# Patient Record
Sex: MALE | Race: WHITE | NOT HISPANIC OR LATINO | Employment: FULL TIME | URBAN - METROPOLITAN AREA
[De-identification: names, ages, dates, MRNs, and addresses within clinical notes are randomized per-mention and may not be internally consistent; named-entity substitution may affect disease eponyms.]

---

## 2017-03-01 ENCOUNTER — OFFICE VISIT (OUTPATIENT)
Dept: FAMILY MEDICINE CLINIC | Facility: CLINIC | Age: 37
End: 2017-03-01

## 2017-03-01 VITALS
WEIGHT: 182 LBS | OXYGEN SATURATION: 99 % | DIASTOLIC BLOOD PRESSURE: 78 MMHG | BODY MASS INDEX: 23.36 KG/M2 | SYSTOLIC BLOOD PRESSURE: 112 MMHG | HEIGHT: 74 IN | HEART RATE: 72 BPM

## 2017-03-01 DIAGNOSIS — M62.838 TRAPEZIUS MUSCLE SPASM: Primary | ICD-10-CM

## 2017-03-01 PROCEDURE — 99203 OFFICE O/P NEW LOW 30 MIN: CPT | Performed by: NURSE PRACTITIONER

## 2017-03-01 RX ORDER — CYCLOBENZAPRINE HCL 10 MG
10 TABLET ORAL 3 TIMES DAILY PRN
Qty: 30 TABLET | Refills: 0 | Status: SHIPPED | OUTPATIENT
Start: 2017-03-01 | End: 2022-10-16

## 2017-03-01 NOTE — PROGRESS NOTES
Subjective   Garth Mckeon is a 36 y.o. male.     History of Present Illness NP presents with right shoulder and neck pain that has been present for about a week. Patient feels that he may have pulled something while in the gym over a year ago. Pain has been recurrent since then and usually lasts about one week. Pain is described as sharp and is present with movement and sitting. Limited ROM. He denies numbness, tingling, or weakness. Using Ibuprofen and rest.     The following portions of the patient's history were reviewed and updated as appropriate: allergies, current medications, past family history, past medical history, past social history, past surgical history and problem list.    Review of Systems   Musculoskeletal: Positive for neck pain.       Objective   Physical Exam   Constitutional: He appears well-developed and well-nourished.   HENT:   Head: Normocephalic and atraumatic.   Eyes: EOM are normal. Pupils are equal, round, and reactive to light.   Neck: Normal range of motion.   Cardiovascular: Normal rate.    Pulmonary/Chest: Effort normal.   Lymphadenopathy:     He has no cervical adenopathy.   Neurological: He is alert.   Skin: Skin is warm and dry.   Psychiatric: He has a normal mood and affect. His behavior is normal.   Nursing note and vitals reviewed.  Pt has point tenderness to right trapezius with spasm noted. Pt has excellent range of motion to shoulder with no weakness noted. Pt is right hand dominant.     Assessment/Plan   Garth was seen today for establish care and neck pain.    Diagnoses and all orders for this visit:    Trapezius muscle spasm  -     cyclobenzaprine (FLEXERIL) 10 MG tablet; Take 1 tablet by mouth 3 (Three) Times a Day As Needed for muscle spasms.  -     XR Spine Cervical Complete 4 or 5 View; Future

## 2017-03-01 NOTE — PATIENT INSTRUCTIONS
"Trapezius Palsy With Rehab  The trapezius is a large muscle of the upper back that helps to control the shoulder blade (scapula) and stabilize the spine. Trapezius palsy is a condition affecting the nervous system in the trapezius muscle. The condition results in pain and weakness in the back of the shoulder and upper back. Shoulder function is also decreased, because the scapula contains the socket for \"ball and-socket\" joint of the shoulder. Trapezius palsy is caused by injury to the spinal accessory nerve, which connects to the trapezius muscle.  SYMPTOMS   · Pain that is achy and in the shoulder and/or upper back.  · Decreased shoulder function and/or strength.  · Scapula protrudes (winging of the scapula).  · Back pain when sitting in a chair with a hard back due to the scapula winging and placing pressure on the back of the chair.  · Shrinkage (atrophy) of the trapezius muscle, this may or may not make the neckline look asymmetric.  · Shoulder drooping.  CAUSES   Trapezius palsy is caused by damage to the spinal accessory nerve, which is connected to the trapezius muscle. This condition is often associated with acromioclavicular (AC) or sternoclavicular subluxation (adjacent bones becoming out of proper alignment, but the joint surfaces are still touching). Common mechanisms of injury include:  · Direct trauma to the shoulder (like being hit with an object or falling on the shoulder).  · Complication of previous surgery that causes nerve damage.  RISK INCREASES WITH:  · Contact sports (i.e., football, rugby, or lacrosse).  · Surgery around the neck.  · Poor strength and flexibility.  PREVENTION   · Warm up and stretch properly before activity.  · Maintain physical fitness:    Strength, flexibility, and endurance.    Cardiovascular fitness.  PROGNOSIS   Trapezius palsy usually resolves spontaneously within 3 to 6 months. Nonsurgical (conservative) treatments may help decrease the severity of symptoms.   RELATED " COMPLICATIONS   · Permanent nerve damage, including pain, numbness, tingling, or weakness.  · Inability to compete at a high level.  · Recurrent symptoms that result in a chronic problem.  · Shoulder stiffness.  TREATMENT  Treatment initially involves resting from any activities that aggravate the symptoms, and the use of ice and medications to help reduce pain and inflammation. The use of strengthening and stretching exercises may help reduce pain with activity. These exercises may be performed at home or with referral to a therapist. A therapist may recommend further treatment, such as:  · The use of electric current to simulate the nerves (transcutaneous electronic nerve stimulation [TENS]).  · Ultrasound.  If symptoms are severe, your caregiver may recommend you wear a brace to decrease discomfort. If symptoms persist for more than 6 months despite nonsurgical treatment, surgery may be recommended (uncommon).  MEDICATION   · If pain medication is necessary, then nonsteroidal anti-inflammatory medications, such as aspirin and ibuprofen, or other minor pain relievers, such as acetaminophen, are often recommended.  · Do not take pain medication for 7 days before surgery.  · Prescription pain relievers may be given if deemed necessary by your caregiver. Use only as directed and only as much as you need.  HEAT AND COLD  · Cold treatment (icing) relieves pain and reduces inflammation. Cold treatment should be applied for 10 to 15 minutes every 2 to 3 hours for inflammation and pain and immediately after any activity that aggravates your symptoms. Use ice packs or massage the area with a piece of ice (ice massage).  · Heat treatment may be used prior to performing the stretching and strengthening activities prescribed by your caregiver, physical therapist, or . Use a heat pack or soak your injury in warm water.  SEEK MEDICAL CARE IF:  · Treatment seems to offer no benefit, or the condition  worsens.  · Any medications produce adverse side effects.  EXERCISES  RANGE OF MOTION (ROM) AND STRETCHING EXERCISES - Trapezius Palsy (Spinal Accessory Nerve Palsy)  These exercises may help you when beginning to rehabilitate your injury. Your symptoms may resolve with or without further involvement from your physician, physical therapist or . While completing these exercises, remember:   · Restoring tissue flexibility helps normal motion to return to the joints. This allows healthier, less painful movement and activity.  · An effective stretch should be held for at least 30 seconds.  · A stretch should never be painful. You should only feel a gentle lengthening or release in the stretched tissue.  STRETCH - Flexion, Standing  · Stand with good posture. With an underhand  on your right / left and an overhand  on the opposite hand, grasp a broomstick or cane so that your hands are a little more than shoulder-width apart.  · Keeping your right / left elbow straight and shoulder muscles relaxed, push the stick with your opposite hand to raise your right / left arm in front of your body and then overhead. Raise your arm until you feel a stretch in your right / left shoulder, but before you have increased shoulder pain.  · Try to avoid shrugging your right / left shoulder as your arm rises by keeping your shoulder blade tucked down and toward your mid-back spine. Hold __________ seconds.  · Slowly return to the starting position.  Repeat __________ times. Complete this exercise __________ times per day.   STRETCH - Abduction, Supine  · Stand with good posture. With an underhand  on your right / left and an overhand  on the opposite hand, grasp a broomstick or cane so that your hands are a little more than shoulder-width apart.  · Keeping your right / left elbow straight and shoulder muscles relaxed, push the stick with your opposite hand to raise your right / left arm out to the side of  "your body and then overhead. Raise your arm until you feel a stretch in your right / left shoulder, but before you have increased shoulder pain.  · Try to avoid shrugging your right / left shoulder as your arm rises by keeping your shoulder blade tucked down and toward your mid-back spine. Hold __________ seconds.  · Slowly return to the starting position.  Repeat __________ times. Complete this exercise __________ times per day.   ROM - Flexion, Active-Assisted  · Lie on your back. You may bend your knees for comfort.  · Grasp a broomstick or cane so your hands are about shoulder-width apart. Your right / left hand should  the end of the stick/cane so that your hand is positioned \"thumbs-up,\" as if you were about to shake hands.  · Using your healthy arm to lead, raise your right / left arm overhead until you feel a gentle stretch in your shoulder. Hold __________ seconds.  · Use the stick/cane to assist in returning your right / left arm to its starting position.  Repeat __________ times. Complete this exercise __________ times per day.   STRETCH - External Rotation and Abduction  · Stagger your stance through a doorframe. It does not matter which foot is forward.  · Choose one of the following positions as instructed by your physician, physical therapist or : place your hands:    and forearms above your head and on the door frame.    and forearms at head-height and on the door frame.    at elbow-height and on the door frame.  · Keeping your head and chest upright and your stomach muscles tight to prevent over-extending your low-back, slowly shift your weight onto your front foot until you feel a stretch across your chest and/or in the front of your shoulders.  · Hold __________ seconds. Shift your weight to your back foot to release the stretch.  Repeat __________ times. Complete this stretch __________ times per day.   STRENGTHENING EXERCISES - Trapezius Palsy (Spinal Accessory Nerve " Palsy)  These exercises may help you when beginning to rehabilitate your injury. They may resolve your symptoms with or without further involvement from your physician, physical therapist or . While completing these exercises, remember:   · Muscles can gain both the endurance and the strength needed for everyday activities through controlled exercises.  · Complete these exercises as instructed by your physician, physical therapist or . Progress with the resistance and repetition exercises only as your caregiver advises.  · You may experience muscle soreness or fatigue, but the pain or discomfort you are trying to eliminate should never worsen during these exercises. If this pain does worsen, stop and make certain you are following the directions exactly. If the pain is still present after adjustments, discontinue the exercise until you can discuss the trouble with your clinician.  STRENGTH - Scapular Depression and Adduction  · With good posture, sit on a firm chair. Support your arms in front of you with pillows, arm rests or a table top. Have your elbows in line with the sides of your body.  · Gently draw your shoulder blades down and toward your mid-back spine. Gradually increase the tension without tensing the muscles along the top of your shoulders and the back of your neck.  · Hold for __________ seconds. Slowly release the tension and relax your muscles completely before completing the next repetition.  · After you have practiced this exercise, remove the arm support and complete it while standing as well as sitting.  Repeat __________ times. Complete this exercise __________ times per day.   STRENGTH - Shoulder Abductors, Isometric   · With good posture, stand or sit about 4-6 inches from a wall with your right / left side facing the wall.  · Bend your right / left elbow. Gently press your right / left elbow into the wall. Increase the pressure gradually until you are pressing  as hard as you can without shrugging your shoulder or increasing any shoulder discomfort.  · Hold __________ seconds.  · Release the tension slowly. Relax your shoulder muscles completely before you do the next repetition.  Repeat __________ times. Complete this exercise __________ times per day.   STRENGTH - Shoulder Flexion, Isometric  · With good posture and facing a wall, stand or sit about 4-6 inches away.  · Keeping your right / left elbow straight, gently press the top of your fist into the wall. Increase the pressure gradually until you are pressing as hard as you can without shrugging your shoulder or increasing any shoulder discomfort.  · Hold __________ seconds.  · Release the tension slowly. Relax your shoulder muscles completely before you do the next repetition.  Repeat __________ times. Complete this exercise __________ times per day.   STRENGTH - Internal Rotators  · Secure a rubber exercise band/tubing to a fixed object so that it is at the same height as your right / left elbow when you are standing or sitting on a firm surface.  · Stand or sit so that the secured exercise band/tubing is at your right / left side.  · Bend your elbow 90 degrees. Place a folded towel or small pillow under your right / left arm so that your elbow is a few inches away from your side.  · Keeping the tension on the exercise band/tubing, pull it across your body toward your abdomen. Be sure to keep your body steady so that the movement is only coming from your shoulder rotating.  · Hold __________ seconds. Release the tension in a controlled manner as you return to the starting position.  Repeat __________ times. Complete this exercise __________ times per day.   STRENGTH - External Rotators  · Secure a rubber exercise band/tubing to a fixed object so that it is at the same height as your right / left elbow when you are standing or sitting on a firm surface.  · Stand or sit so that the secured exercise band/tubing is at  your side that is not injured.  · Bend your elbow 90 degrees. Place a folded towel or small pillow under your right / left arm so that your elbow is a few inches away from your side.  · Keeping the tension on the exercise band/tubing, pull it away from your body, as if pivoting on your elbow. Be sure to keep your body steady so that the movement is only coming from your shoulder rotating.  · Hold __________ seconds. Release the tension in a controlled manner as you return to the starting position.  Repeat __________ times. Complete this exercise __________ times per day.   STRENGTH - Shoulder Extensors  · Secure a rubber exercise band/tubing so that it is at the height of your shoulders when you are either standing or sitting on a firm arm-less chair.  · With a thumbs-up , grasp an end of the band/tubing in each hand. Straighten your elbows and lift your hands straight in front of you at shoulder height. Step back away from the secured end of band/tubing until it becomes tense.  · Squeezing your shoulder blades together, pull your hands down to the sides of your thighs. Do not allow your hands to go behind you.  · Hold for __________ seconds. Slowly ease the tension on the band/tubing as you reverse the directions and return to the starting position.  Repeat __________ times. Complete this exercise __________ times per day.   STRENGTH - Shoulder Extensors, Prone  · Lie on your stomach on a firm surface so that your right / left arm overhangs the edge. Rest your forehead on your opposite forearm. With your thumb facing away from your body and your elbow straight, hold a __________ weight in your hand.  · Squeeze your right / left shoulder blade to your mid-back spine and then slowly raise your arm behind you to the height of the bed.  · Hold for __________ seconds. Slowly reverse the directions and return to the starting position, controlling the weight as you lower your arm.  Repeat __________ times. Complete  this exercise __________ times per day.   STRENGTH - Horizontal Abductors  Choose one of the two oppositions to complete this exercise.  Prone (lying on stomach):  · Lie on your stomach on a firm surface so that your right / left arm overhangs the edge. Rest your forehead on your opposite forearm. With your palm facing the floor and your elbow straight, hold a __________ weight in your hand.  · Squeeze your right / left shoulder blade to your mid-back spine and then slowly raise your arm to the height of the bed.  · Hold for __________ seconds. Slowly reverse the directions and return to the starting position, controlling the weight as you lower your arm.  Repeat __________ times. Complete this exercise __________ times per day.  Standing:   · Secure a rubber exercise band/tubing so that it is at the height of your shoulders when you are either standing or sitting on a firm arm-less chair.  · Grasp an end of the band/tubing in each hand and have your palms face each other. Straighten your elbows and lift your hands straight in front of you at shoulder height. Step back away from the secured end of band/tubing until it becomes tense.  · Squeeze your shoulder blades together. Keeping your elbows locked and your hands at shoulder-height, bring your hands out to your side.  · Hold __________ seconds. Slowly ease the tension on the band/tubing as you reverse the directions and return to the starting position.  Repeat __________ times. Complete this exercise __________ times per day.  STRENGTH - Scapular Retractors and Elevators  · Secure a rubber exercise band/tubing so that it is at the height of your shoulders when you are either standing or sitting on a firm arm-less chair.  · With a thumbs-up , grasp an end of the band/tubing in each hand. Step back away from the secured end of band/tubing until it becomes tense.  · Squeezing your shoulder blades together, straighten your elbows and lift your hands straight over  your head.  · Hold for __________ seconds. Slowly ease the tension on the band/tubing as you reverse the directions and return to the starting position.  Repeat __________ times. Complete this exercise __________ times per day.      This information is not intended to replace advice given to you by your health care provider. Make sure you discuss any questions you have with your health care provider.     Document Released: 12/18/2006 Document Revised: 05/03/2016 Document Reviewed: 11/03/2016  Elsevier Interactive Patient Education ©2016 [employer]

## 2020-08-19 ENCOUNTER — TELEMEDICINE (OUTPATIENT)
Dept: FAMILY MEDICINE CLINIC | Facility: CLINIC | Age: 40
End: 2020-08-19

## 2020-08-19 DIAGNOSIS — G44.309 PERSISTENT HEADACHE DUE TO AND NOT CONCURRENT WITH INJURY OF HEAD: Primary | ICD-10-CM

## 2020-08-19 PROCEDURE — 99213 OFFICE O/P EST LOW 20 MIN: CPT | Performed by: NURSE PRACTITIONER

## 2020-08-19 NOTE — PATIENT INSTRUCTIONS
General Headache Without Cause  A headache is pain or discomfort that is felt around the head or neck area. There are many causes and types of headaches. In some cases, the cause may not be found.  Follow these instructions at home:  Watch your condition for any changes. Let your doctor know about them. Take these steps to help with your condition:  Managing pain         · Take over-the-counter and prescription medicines only as told by your doctor.  · Lie down in a dark, quiet room when you have a headache.  · If told, put ice on your head and neck area:  ? Put ice in a plastic bag.  ? Place a towel between your skin and the bag.  ? Leave the ice on for 20 minutes, 2-3 times per day.  · If told, put heat on the affected area. Use the heat source that your doctor recommends, such as a moist heat pack or a heating pad.  ? Place a towel between your skin and the heat source.  ? Leave the heat on for 20-30 minutes.  ? Remove the heat if your skin turns bright red. This is very important if you are unable to feel pain, heat, or cold. You may have a greater risk of getting burned.  · Keep lights dim if bright lights bother you or make your headaches worse.  Eating and drinking  · Eat meals on a regular schedule.  · If you drink alcohol:  ? Limit how much you use to:  § 0-1 drink a day for women.  § 0-2 drinks a day for men.  ? Be aware of how much alcohol is in your drink. In the [place] one drink equals one 12 oz bottle of beer (355 mL), one 5 oz glass of wine (148 mL), or one 1½ oz glass of hard liquor (44 mL).  · Stop drinking caffeine, or reduce how much caffeine you drink.  General instructions    · Keep a journal to find out if certain things bring on headaches. For example, write down:  ? What you eat and drink.  ? How much sleep you get.  ? Any change to your diet or medicines.  · Get a massage or try other ways to relax.  · Limit stress.  · Sit up straight. Do not tighten (tense) your muscles.  · Do not use any  products that contain nicotine or tobacco. This includes cigarettes, e-cigarettes, and chewing tobacco. If you need help quitting, ask your doctor.  · Exercise regularly as told by your doctor.  · Get enough sleep. This often means 7-9 hours of sleep each night.  · Keep all follow-up visits as told by your doctor. This is important.  Contact a doctor if:  · Your symptoms are not helped by medicine.  · You have a headache that feels different than the other headaches.  · You feel sick to your stomach (nauseous) or you throw up (vomit).  · You have a fever.  Get help right away if:  · Your headache gets very bad quickly.  · Your headache gets worse after a lot of physical activity.  · You keep throwing up.  · You have a stiff neck.  · You have trouble seeing.  · You have trouble speaking.  · You have pain in the eye or ear.  · Your muscles are weak or you lose muscle control.  · You lose your balance or have trouble walking.  · You feel like you will pass out (faint) or you pass out.  · You are mixed up (confused).  · You have a seizure.  Summary  · A headache is pain or discomfort that is felt around the head or neck area.  · There are many causes and types of headaches. In some cases, the cause may not be found.  · Keep a journal to help find out what causes your headaches. Watch your condition for any changes. Let your doctor know about them.  · Contact a doctor if you have a headache that is different from usual, or if your headache is not helped by medicine.  · Get help right away if your headache gets very bad, you throw up, you have trouble seeing, you lose your balance, or you have a seizure.  This information is not intended to replace advice given to you by your health care provider. Make sure you discuss any questions you have with your health care provider.  Document Released: 09/26/2009 Document Revised: 07/08/2019 Document Reviewed: 07/08/2019  Elsevier Patient Education © 2020 [employer]

## 2020-08-19 NOTE — PROGRESS NOTES
Subjective New pt to us.  Garth Mckeon is a 40 y.o. male.     History of Present Illness   This is a video visit.  Has had a persistent headache for about a week. He had a mild fever but has subsided over the last 2 days.  He has been to the  at [facility] and the [facility].  He had an injection and some bloodwork  He had 2 covid tests that were negative. He has been taking tylenol and a muscle relaxer which didn't help.  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    Review of Systems   Constitutional: Negative for activity change, appetite change and fever.   Respiratory: Negative for cough and shortness of breath.    Cardiovascular: Negative for chest pain and leg swelling.   Skin: Negative for rash.   Neurological: Positive for headache. Negative for dizziness.       Objective   Physical Exam   Constitutional: He is oriented to person, place, and time. He appears well-developed and well-nourished. No distress.   HENT:   Head: Normocephalic and atraumatic.   Eyes: EOM are normal.   Pulmonary/Chest: Effort normal.   Musculoskeletal: Normal range of motion.   Neurological: He is alert and oriented to person, place, and time. Coordination normal.   Psychiatric: He has a normal mood and affect.   Nursing note and vitals reviewed.        Assessment/Plan   Diagnoses and all orders for this visit:    Persistent headache due to and not concurrent with injury of head  -     CT Head Without Contrast; Future      The use of a video visit has been reviewed with the patient and verbal informed consent has been obtained.  I spent 25 minutes with the pt. Discussing his current symptoms, treatment ,pmhx and pfhx. I reviewed his emergency record including his labs and imaging.

## 2020-08-31 ENCOUNTER — HOSPITAL ENCOUNTER (OUTPATIENT)
Dept: CT IMAGING | Facility: HOSPITAL | Age: 40
Discharge: HOME | End: 2020-08-31
Admitting: NURSE PRACTITIONER

## 2020-08-31 DIAGNOSIS — G44.309 PERSISTENT HEADACHE DUE TO AND NOT CONCURRENT WITH INJURY OF HEAD: ICD-10-CM

## 2020-08-31 PROCEDURE — 70450 CT HEAD/BRAIN W/O DYE: CPT

## 2021-04-02 ENCOUNTER — BULK ORDERING (OUTPATIENT)
Dept: CASE MANAGEMENT | Facility: OTHER | Age: 41
End: 2021-04-02

## 2021-04-02 DIAGNOSIS — Z23 IMMUNIZATION DUE: ICD-10-CM

## 2022-11-30 ENCOUNTER — TELEPHONE (OUTPATIENT)
Dept: FAMILY MEDICINE CLINIC | Facility: CLINIC | Age: 42
End: 2022-11-30

## 2022-11-30 NOTE — TELEPHONE ENCOUNTER
Patient used to see Angelina Dempsey (1 visit).  Had one telehealth in 2020 with Marisabel.  Wants to schedule flu and covid booster.   Wife sees Dr. Layton.  Ok to schedule?

## 2022-11-30 NOTE — TELEPHONE ENCOUNTER
Patient's wife called to follow up and clarify that this is Elo Mckeon's dad. Wife is Brian Chiang.   Patient wants to schedule vaccines, but also establish care with Dr. Layton if she will take him. Please advise.

## 2022-12-27 ENCOUNTER — OFFICE VISIT (OUTPATIENT)
Dept: FAMILY MEDICINE CLINIC | Facility: CLINIC | Age: 42
End: 2022-12-27

## 2022-12-27 VITALS
SYSTOLIC BLOOD PRESSURE: 138 MMHG | DIASTOLIC BLOOD PRESSURE: 92 MMHG | HEART RATE: 79 BPM | WEIGHT: 190 LBS | HEIGHT: 74 IN | RESPIRATION RATE: 16 BRPM | BODY MASS INDEX: 24.38 KG/M2 | OXYGEN SATURATION: 100 %

## 2022-12-27 DIAGNOSIS — Z11.59 ENCOUNTER FOR HEPATITIS C SCREENING TEST FOR LOW RISK PATIENT: ICD-10-CM

## 2022-12-27 DIAGNOSIS — R03.0 ELEVATED BP WITHOUT DIAGNOSIS OF HYPERTENSION: ICD-10-CM

## 2022-12-27 DIAGNOSIS — Z00.00 PREVENTATIVE HEALTH CARE: Primary | ICD-10-CM

## 2022-12-27 DIAGNOSIS — Z23 NEED FOR VACCINATION: ICD-10-CM

## 2022-12-27 DIAGNOSIS — Z13.220 SCREENING, LIPID: ICD-10-CM

## 2022-12-27 DIAGNOSIS — Z13.228 SCREENING FOR METABOLIC DISORDER: ICD-10-CM

## 2022-12-27 PROCEDURE — 90471 IMMUNIZATION ADMIN: CPT | Performed by: NURSE PRACTITIONER

## 2022-12-27 PROCEDURE — 99396 PREV VISIT EST AGE 40-64: CPT | Performed by: NURSE PRACTITIONER

## 2022-12-27 PROCEDURE — 90686 IIV4 VACC NO PRSV 0.5 ML IM: CPT | Performed by: NURSE PRACTITIONER

## 2022-12-27 PROCEDURE — 91312 COVID-19 (PFIZER) BIVALENT BOOSTER 12+YRS: CPT | Performed by: NURSE PRACTITIONER

## 2022-12-27 PROCEDURE — 0124A PR ADM SARSCOV2 30MCG/0.3ML BST: CPT | Performed by: NURSE PRACTITIONER

## 2022-12-27 NOTE — PROGRESS NOTES
Subjective   Garth Mckeon is a 42 y.o. male.     History of Present Illness   Established patient new to this provider. Due annual exam, labs, vaccines. No acute health concerns.     Elevated BP with dx HTN. Pt is stressed. Recently bought a home and is currently living with his parents with 22 mo old. Some holiday stress. Denies chest pain, chest pressure,headache or palps. He is not smoking. Minimal alcohol intake.  He did drink espresso this morning prior to appointment.    The following portions of the patient's history were reviewed and updated as appropriate: allergies, current medications, past family history, past medical history, past social history, past surgical history and problem list.    Review of Systems   Constitutional: Negative for activity change, appetite change, diaphoresis, fatigue, fever and unexpected weight loss.   HENT: Negative for congestion.         Dental exam is due      Eyes: Negative for visual disturbance.        No vision issues, no contacts or glasses     Respiratory: Negative for cough, shortness of breath and wheezing.    Cardiovascular: Negative for chest pain, palpitations and leg swelling.   Gastrointestinal: Negative for abdominal distention, blood in stool, constipation, diarrhea and GERD.   Endocrine: Negative for cold intolerance, heat intolerance, polydipsia, polyphagia and polyuria.   Genitourinary: Negative for flank pain, penile swelling, testicular pain and urinary incontinence.   Musculoskeletal: Negative for arthralgias and back pain.   Skin: Negative for rash.   Allergic/Immunologic: Negative for environmental allergies.   Neurological: Negative for syncope, weakness and headache.   Hematological: Does not bruise/bleed easily.   Psychiatric/Behavioral: Positive for stress. Negative for sleep disturbance, suicidal ideas and depressed mood. The patient is not nervous/anxious.        Objective   Physical Exam  Vitals reviewed.   Constitutional:       Appearance:  Normal appearance. He is normal weight.   HENT:      Head: Normocephalic.      Right Ear: Tympanic membrane normal.      Left Ear: Tympanic membrane normal.      Nose: Nose normal.      Mouth/Throat:      Mouth: Mucous membranes are moist.   Eyes:      Pupils: Pupils are equal, round, and reactive to light.   Cardiovascular:      Rate and Rhythm: Normal rate and regular rhythm.      Pulses: Normal pulses.      Heart sounds: Normal heart sounds.   Pulmonary:      Effort: Pulmonary effort is normal.      Breath sounds: Normal breath sounds.   Abdominal:      General: Abdomen is flat. Bowel sounds are normal.      Palpations: Abdomen is soft.   Musculoskeletal:         General: Normal range of motion.      Cervical back: Normal range of motion.   Skin:     General: Skin is warm.   Neurological:      General: No focal deficit present.      Mental Status: He is alert.   Psychiatric:         Mood and Affect: Mood normal.         Vitals:    12/27/22 1058   BP: 138/92   Pulse: 79   Resp: 16   SpO2: 100%     Body mass index is 24.39 kg/m².    Procedures    Assessment & Plan   Problems Addressed this Visit    None  Visit Diagnoses     Preventative health care    -  Primary    Elevated BP without diagnosis of hypertension        Screening for metabolic disorder        Relevant Orders    Comprehensive Metabolic Panel    Screening, lipid        Relevant Orders    Lipid Panel With / Chol / HDL Ratio    Encounter for hepatitis C screening test for low risk patient        Relevant Orders    Hepatitis C Antibody    Need for vaccination          Diagnoses       Codes Comments    Preventative health care    -  Primary ICD-10-CM: Z00.00  ICD-9-CM: V70.0     Elevated BP without diagnosis of hypertension     ICD-10-CM: R03.0  ICD-9-CM: 796.2     Screening for metabolic disorder     ICD-10-CM: Z13.228  ICD-9-CM: V77.99     Screening, lipid     ICD-10-CM: Z13.220  ICD-9-CM: V77.91     Encounter for hepatitis C screening test for low risk  patient     ICD-10-CM: Z11.59  ICD-9-CM: V73.89     Need for vaccination     ICD-10-CM: Z23  ICD-9-CM: V05.9         Orders Placed This Encounter   Procedures   • FluLaval/Fluzone >6 mos ([id number])   • COVID-19 Bivalent Booster (Pfizer) 12+yrs   • Comprehensive Metabolic Panel     Order Specific Question:   Release to patient     Answer:   Routine Release   • Lipid Panel With / Chol / HDL Ratio     Order Specific Question:   Release to patient     Answer:   Routine Release   • Hepatitis C Antibody     Order Specific Question:   Release to patient     Answer:   Routine Release       Preventative care- Follow heart healthy diet, drink water, walk daily. Wear seatbelts, wear helmets, wear sunscreens. Follow CDC guidelines for covid pandemic.     Elevated BP without diagnosis of hypertension-encourage Dash diet/heart healthy diet, walking daily, water intake, switch espresso to regular coffee or consider half caffeinated coffee.  Limit stress and stimulants.  Limit high sodium foods.  Patient will consider getting blood pressure cuff and keeping blood pressure log for this provider.  Goal blood pressure less than 140/90 consistently.  Encourage patient to consider yoga and meditation     Education provided in AVS   Return in about 1 year (around 12/27/2023) for Annual.

## 2022-12-28 LAB
ALBUMIN SERPL-MCNC: 4.8 G/DL (ref 4–5)
ALBUMIN/GLOB SERPL: 1.9 {RATIO} (ref 1.2–2.2)
ALP SERPL-CCNC: 69 IU/L (ref 44–121)
ALT SERPL-CCNC: 22 IU/L (ref 0–44)
AST SERPL-CCNC: 22 IU/L (ref 0–40)
BILIRUB SERPL-MCNC: 0.3 MG/DL (ref 0–1.2)
BUN SERPL-MCNC: 17 MG/DL (ref 6–24)
BUN/CREAT SERPL: 18 (ref 9–20)
CALCIUM SERPL-MCNC: 9.9 MG/DL (ref 8.7–10.2)
CHLORIDE SERPL-SCNC: 103 MMOL/L (ref 96–106)
CHOLEST SERPL-MCNC: 253 MG/DL (ref 100–199)
CHOLEST/HDLC SERPL: 4.8 RATIO (ref 0–5)
CO2 SERPL-SCNC: 25 MMOL/L (ref 20–29)
CREAT SERPL-MCNC: 0.95 MG/DL (ref 0.76–1.27)
EGFRCR SERPLBLD CKD-EPI 2021: 102 ML/MIN/1.73
GLOBULIN SER CALC-MCNC: 2.5 G/DL (ref 1.5–4.5)
GLUCOSE SERPL-MCNC: 94 MG/DL (ref 70–99)
HCV AB S/CO SERPL IA: 0.1 S/CO RATIO (ref 0–0.9)
HDLC SERPL-MCNC: 53 MG/DL
LDLC SERPL CALC-MCNC: 186 MG/DL (ref 0–99)
POTASSIUM SERPL-SCNC: 4.5 MMOL/L (ref 3.5–5.2)
PROT SERPL-MCNC: 7.3 G/DL (ref 6–8.5)
SODIUM SERPL-SCNC: 142 MMOL/L (ref 134–144)
TRIGL SERPL-MCNC: 84 MG/DL (ref 0–149)
VLDLC SERPL CALC-MCNC: 14 MG/DL (ref 5–40)

## 2023-02-01 ENCOUNTER — OFFICE VISIT (OUTPATIENT)
Dept: FAMILY MEDICINE CLINIC | Facility: CLINIC | Age: 43
End: 2023-02-01
Payer: COMMERCIAL

## 2023-02-01 VITALS
HEART RATE: 78 BPM | DIASTOLIC BLOOD PRESSURE: 84 MMHG | BODY MASS INDEX: 24.38 KG/M2 | HEIGHT: 74 IN | SYSTOLIC BLOOD PRESSURE: 124 MMHG | OXYGEN SATURATION: 100 % | WEIGHT: 190 LBS

## 2023-02-01 DIAGNOSIS — F43.9 STRESS AT HOME: ICD-10-CM

## 2023-02-01 DIAGNOSIS — F41.9 ANXIETY: Primary | ICD-10-CM

## 2023-02-01 PROBLEM — F32.A ANXIETY AND DEPRESSION: Status: ACTIVE | Noted: 2023-02-01

## 2023-02-01 PROCEDURE — 99214 OFFICE O/P EST MOD 30 MIN: CPT | Performed by: NURSE PRACTITIONER

## 2023-02-01 NOTE — PROGRESS NOTES
Subjective   Garth Mckeon is a 42 y.o. male.     History of Present Illness   Estab pt here for follow up on anxiety and elevated BP  BP is normal today    Acute on chronic anxiety. He has has worsening anxiety over past 2 years with new baby. He reports being on time has always been difficult and heightened anxiety with mundane daily tasks. He gets stressed with son's health. His wife made this appt and she is on zoloft, She has very similar symptoms. He has a heakthy diet, drinks mostly water, sleeps great. He and wife are good supports for one another and he has active sex life. He has meaningful work. Stress with new house. LOW 7 score:8 , denies SI/HI. He has never been on medications in the past. He does not drink alcohol regularly.      The following portions of the patient's history were reviewed and updated as appropriate: allergies, current medications, past family history, past medical history, past social history, past surgical history and problem list.    Review of Systems    Objective   Physical Exam  Vitals reviewed.   HENT:      Mouth/Throat:      Mouth: Mucous membranes are moist.   Cardiovascular:      Rate and Rhythm: Normal rate.      Pulses: Normal pulses.      Heart sounds: Normal heart sounds.   Pulmonary:      Effort: Pulmonary effort is normal.      Breath sounds: Normal breath sounds.   Skin:     General: Skin is warm.   Neurological:      Mental Status: He is alert.   Psychiatric:         Mood and Affect: Mood is anxious.         Vitals:    02/01/23 1050   BP: 124/84   Pulse: 78   SpO2: 100%     Body mass index is 24.39 kg/m².    Procedures    Assessment & Plan   Problems Addressed this Visit    None  Visit Diagnoses     Anxiety    -  Primary    Stress at home          Diagnoses       Codes Comments    Anxiety    -  Primary ICD-10-CM: F41.9  ICD-9-CM: 300.00     Stress at home     ICD-10-CM: F43.9  ICD-9-CM: V61.9         Anxiety-reviewed Low 7 score, med s/e profile SSRI and risks, call  [phone] or go to ER for severe worsening sx, start low dose sertraline 25 mg x 1 week then 50 mg daily  Follow up in 2 weeks, reviewed sleep hygiene and proper heart healthy diet, exercise to help improve mental health  Will consider therapy       Education provided in AVS   Return in about 2 weeks (around 2/15/2023) for Recheck.

## 2024-07-02 ENCOUNTER — OFFICE VISIT (OUTPATIENT)
Dept: FAMILY MEDICINE CLINIC | Facility: CLINIC | Age: 44
End: 2024-07-02
Payer: COMMERCIAL

## 2024-07-02 VITALS
BODY MASS INDEX: 25.15 KG/M2 | HEIGHT: 74 IN | WEIGHT: 196 LBS | RESPIRATION RATE: 18 BRPM | DIASTOLIC BLOOD PRESSURE: 80 MMHG | OXYGEN SATURATION: 99 % | HEART RATE: 74 BPM | SYSTOLIC BLOOD PRESSURE: 130 MMHG

## 2024-07-02 DIAGNOSIS — L98.9 SKIN LESION: Primary | ICD-10-CM

## 2024-07-02 PROCEDURE — 99213 OFFICE O/P EST LOW 20 MIN: CPT | Performed by: NURSE PRACTITIONER

## 2024-07-02 NOTE — PROGRESS NOTES
Subjective   Garth Mckeon is a 44 y.o. male.     Chief Complaint   Patient presents with    sore on leg     Right lower leg         History of Present Illness   Patient presents with c/o lesion to lower right leg that has been there for several years. He reports that it has grown slightly and he would like to have this looked at by a dermatology. He denies any itching or pain to site. He denies any discharge from site. He does not try to apply medication    The following portions of the patient's history were reviewed and updated as appropriate: allergies, current medications, past family history, past medical history, past social history, past surgical history and problem list.    Review of Systems   Constitutional:  Negative for chills, fatigue and fever.   Respiratory:  Negative for cough, chest tightness, shortness of breath and wheezing.    Cardiovascular:  Negative for chest pain, palpitations and leg swelling.   Musculoskeletal:  Negative for arthralgias and joint swelling.   Skin:  Positive for skin lesions (right lower leg). Negative for color change, dry skin, pallor, rash and wound.   Allergic/Immunologic: Negative.    Neurological:  Negative for dizziness, weakness and headache.       Objective   Physical Exam  Vitals and nursing note reviewed.   Constitutional:       Appearance: Normal appearance. He is well-developed and normal weight.   HENT:      Head: Normocephalic and atraumatic.   Eyes:      Conjunctiva/sclera: Conjunctivae normal.      Pupils: Pupils are equal, round, and reactive to light.   Neck:      Thyroid: No thyromegaly.   Cardiovascular:      Rate and Rhythm: Normal rate and regular rhythm.      Heart sounds: Normal heart sounds. No murmur heard.  Pulmonary:      Effort: Pulmonary effort is normal.      Breath sounds: Normal breath sounds.   Abdominal:      General: Bowel sounds are normal. There is no distension.      Palpations: Abdomen is soft. There is no mass.      Tenderness: There  is no abdominal tenderness. There is no guarding or rebound.      Hernia: No hernia is present.   Musculoskeletal:      Cervical back: Normal range of motion and neck supple.   Lymphadenopathy:      Cervical: No cervical adenopathy.   Skin:     General: Skin is warm and dry.   Neurological:      Mental Status: He is alert and oriented to person, place, and time.   Psychiatric:         Behavior: Behavior normal.         Thought Content: Thought content normal.         Judgment: Judgment normal.         Vitals:    07/02/24 0757   BP: 130/80   Pulse: 74   Resp: 18   SpO2: 99%     Body mass index is 25.16 kg/m².      Procedures    Assessment & Plan   Problems Addressed this Visit    None  Visit Diagnoses       Skin lesion    -  Primary    Relevant Orders    Ambulatory Referral to Dermatology (Completed)          Diagnoses         Codes Comments    Skin lesion    -  Primary ICD-10-CM: L98.9  ICD-9-CM: 709.9           Referral to dermatology           Return if symptoms worsen or fail to improve.  Answers submitted by the patient for this visit:  Primary Reason for Visit (Submitted on 6/27/2024)  What is the primary reason for your visit?: Other  Other (Submitted on 6/27/2024)  Please describe your symptoms.: Possible cancerous spot on leg  Have you had these symptoms before?: Yes  How long have you been having these symptoms?: Greater than 2 weeks

## 2025-08-15 ENCOUNTER — TELEPHONE (OUTPATIENT)
Dept: FAMILY MEDICINE CLINIC | Facility: CLINIC | Age: 45
End: 2025-08-15
Payer: COMMERCIAL

## 2025-08-18 ENCOUNTER — OFFICE VISIT (OUTPATIENT)
Dept: FAMILY MEDICINE CLINIC | Facility: CLINIC | Age: 45
End: 2025-08-18
Payer: COMMERCIAL

## 2025-08-18 VITALS
WEIGHT: 199 LBS | DIASTOLIC BLOOD PRESSURE: 90 MMHG | RESPIRATION RATE: 16 BRPM | HEART RATE: 81 BPM | HEIGHT: 74 IN | OXYGEN SATURATION: 98 % | SYSTOLIC BLOOD PRESSURE: 130 MMHG | BODY MASS INDEX: 25.54 KG/M2

## 2025-08-18 DIAGNOSIS — M54.2 NECK PAIN WITHOUT INJURY: ICD-10-CM

## 2025-08-18 DIAGNOSIS — Z12.11 COLON CANCER SCREENING: ICD-10-CM

## 2025-08-18 DIAGNOSIS — M54.2 CERVICALGIA: Primary | ICD-10-CM

## 2025-08-18 PROCEDURE — 99213 OFFICE O/P EST LOW 20 MIN: CPT | Performed by: NURSE PRACTITIONER

## 2025-08-18 RX ORDER — CYCLOBENZAPRINE HCL 5 MG
5 TABLET ORAL 3 TIMES DAILY PRN
Qty: 30 TABLET | Refills: 0 | Status: SHIPPED | OUTPATIENT
Start: 2025-08-18 | End: 2025-08-28